# Patient Record
Sex: FEMALE | Race: WHITE | Employment: UNEMPLOYED | URBAN - METROPOLITAN AREA
[De-identification: names, ages, dates, MRNs, and addresses within clinical notes are randomized per-mention and may not be internally consistent; named-entity substitution may affect disease eponyms.]

---

## 2022-05-25 ENCOUNTER — APPOINTMENT (OUTPATIENT)
Dept: GENERAL RADIOLOGY | Age: 32
End: 2022-05-25
Payer: MEDICAID

## 2022-05-25 ENCOUNTER — HOSPITAL ENCOUNTER (EMERGENCY)
Age: 32
Discharge: HOME | End: 2022-05-25
Attending: EMERGENCY MEDICINE
Payer: MEDICAID

## 2022-05-25 VITALS
DIASTOLIC BLOOD PRESSURE: 63 MMHG | HEART RATE: 58 BPM | RESPIRATION RATE: 12 BRPM | TEMPERATURE: 98.4 F | HEIGHT: 68 IN | BODY MASS INDEX: 19.46 KG/M2 | OXYGEN SATURATION: 100 % | WEIGHT: 128.4 LBS | SYSTOLIC BLOOD PRESSURE: 124 MMHG

## 2022-05-25 DIAGNOSIS — B07.0 PLANTAR WART, RIGHT FOOT: Primary | ICD-10-CM

## 2022-05-25 PROCEDURE — 73630 X-RAY EXAM OF FOOT: CPT

## 2022-05-25 PROCEDURE — 99283 EMERGENCY DEPT VISIT LOW MDM: CPT

## 2022-05-25 ASSESSMENT — PAIN DESCRIPTION - DESCRIPTORS: DESCRIPTORS: PATIENT UNABLE TO DESCRIBE

## 2022-05-25 ASSESSMENT — PAIN DESCRIPTION - ORIENTATION: ORIENTATION: RIGHT

## 2022-05-25 ASSESSMENT — PAIN SCALES - GENERAL: PAINLEVEL_OUTOF10: 10

## 2022-05-25 ASSESSMENT — PAIN DESCRIPTION - LOCATION: LOCATION: FOOT

## 2022-05-25 NOTE — ED PROVIDER NOTES
 Days of Exercise per Week: Not on file    Minutes of Exercise per Session: Not on file   Stress:     Feeling of Stress : Not on file   Social Connections:     Frequency of Communication with Friends and Family: Not on file    Frequency of Social Gatherings with Friends and Family: Not on file    Attends Faith Services: Not on file    Active Member of [address] [place] [address] or Organizations: Not on file    Attends Club or Organization Meetings: Not on file    Marital Status: Not on file   Intimate Partner Violence:     Fear of Current or Ex-Partner: Not on file    Emotionally Abused: Not on file    Physically Abused: Not on file    Sexually Abused: Not on file   Housing Stability:     Unable to Pay for Housing in the Last Year: Not on file    Number of [place] in the Last Year: Not on file    Unstable Housing in the Last Year: Not on file       Nursing Notes Reviewed      ROS:  General: no fever  Musculoskeletal: no arthralgia, no myalgia, no back pain,  no joint swelling  NEURO:  no numbness, no weakness, no dizziness  DERM: + rash, no erythema, no ecchymosis, no wounds      PHYSICAL EXAM:  GENERAL APPEARANCE: Zachary Oneal is in no acute respiratory distress. Awake and alert. VITAL SIGNS:   ED Triage Vitals [05/25/22 1650]   Enc Vitals Group      /63      Heart Rate 58      Resp 12      Temp 98.4 °F (36.9 °C)      Temp Source Oral      SpO2 100 %      Weight 128 lb 6.4 oz (58.2 kg)      Height 5' 8\" (1.727 m)      Head Circumference       Peak Flow       Pain Score       Pain Loc       Pain Edu? Excl. in [address]? HEAD: Normocephalic, atraumatic. EYES:  Extraocular muscles are intact. Conjunctivas are pink. Negative scleral icterus. ENT:  Mucous membranes are moist.    NECK: Nontender and supple. CHEST: Clear to auscultation bilaterally. No rales, rhonchi, or wheezing. HEART:  Regular rate and rhythm. No murmurs. Strong and equal pulses in the upper and lower extremities. MUSCULOSKELETAL:  Active range of motion of the upper and lower extremities. No edema. Right foot with 1cm calloused appearing lesion with central core like lesion. No erythema. Minimally tender. Strong pedal pulse  NEUROLOGICAL: Awake, alert and oriented x 3. Power intact in the upper and lower extremities. DERMATOLOGIC: No petechiae, rashes, or ecchymoses. ED COURSE AND MEDICAL DECISION MAKING:      Radiology:  All plain films have been evaluated by myself. They may have been overread by radiologist as noted in chart. Other radiologic studies (i.e. CT, MRI, ultrasounds, etc ) have been interpreted by radiologist.     XR FOOT RIGHT (MIN 3 VIEWS)   Final Result      Heel swelling without radiopaque foreign body. Labs:  No results found for this visit on 05/25/22. Treatment in the department:  Patient received no meds while in the ED. Medical decision making:  Patient with a few months of calloused appearing lesion. With central indentation and location in unusual area of callous it seems most likely related to a plantar wart. No signs of infection or abscess. No recalled FB and xrays do not show radioopaque FB. Will refer to podiatry for treatment of likely plantar warts. I estimate there is LOW risk for  OSTEOMYELITIS, TENDON OR NEUROVASCULAR INJURY, FOREIGN BODY,  thus I consider the discharge disposition reasonable. Raine Rivas and I have discussed the diagnosis and risks, and we agree with discharging home to follow-up with their primary doctor or the referral orthopedist. We also discussed returning to the Emergency Department immediately if new or worsening symptoms occur. Clinical Impression:  1. Plantar wart, right foot        Dispo:  Patient will be discharged  at this time. Patient was informed of this decision and agrees with plan. I have discussed lab and xray findings with patient and they understand. Questions were answered to the best of my ability.     Discharge vitals:  Blood pressure 124/63, pulse 58, temperature 98.4 °F (36.9 °C), temperature source Oral, resp. rate 12, height 5' 8\" (1.727 m), weight 128 lb 6.4 oz (58.2 kg), SpO2 100 %. Prescriptions given:   Discharge Medication List as of 5/25/2022  7:14 PM            This chart was created using dragon voice recognition software.         Alexia Cohen MD  05/25/22 8765